# Patient Record
Sex: FEMALE | Race: WHITE | NOT HISPANIC OR LATINO | URBAN - METROPOLITAN AREA
[De-identification: names, ages, dates, MRNs, and addresses within clinical notes are randomized per-mention and may not be internally consistent; named-entity substitution may affect disease eponyms.]

---

## 2023-09-05 PROBLEM — H25.10 NUCLEAR SENILE CATARACT: Status: ACTIVE | Noted: 2023-09-05

## 2023-09-05 PROBLEM — H02.834 DERMATOCHALASIS OF LEFT UPPER EYELID: Status: ACTIVE | Noted: 2023-09-05

## 2023-09-05 PROBLEM — J40 SINOBRONCHITIS: Status: ACTIVE | Noted: 2023-09-05

## 2023-09-05 PROBLEM — H40.001 PREGLAUCOMA, UNSPECIFIED, RIGHT EYE: Status: ACTIVE | Noted: 2023-09-05

## 2023-09-05 PROBLEM — J32.9 SINOBRONCHITIS: Status: ACTIVE | Noted: 2023-09-05

## 2023-09-05 PROBLEM — H02.831 DERMATOCHALASIS OF RIGHT UPPER EYELID: Status: ACTIVE | Noted: 2023-09-05

## 2023-09-05 RX ORDER — ALBUTEROL SULFATE 90 UG/1
AEROSOL, METERED RESPIRATORY (INHALATION)
COMMUNITY
Start: 2019-12-23

## 2023-09-05 RX ORDER — FLUTICASONE FUROATE AND VILANTEROL 200; 25 UG/1; UG/1
POWDER RESPIRATORY (INHALATION)
COMMUNITY
Start: 2018-09-18

## 2023-09-05 RX ORDER — BENZONATATE 100 MG/1
100 CAPSULE ORAL 3 TIMES DAILY PRN
COMMUNITY
Start: 2020-10-23

## 2023-12-26 ENCOUNTER — CLINICAL SUPPORT (OUTPATIENT)
Dept: OPHTHALMOLOGY | Facility: CLINIC | Age: 68
End: 2023-12-26
Payer: MEDICARE

## 2023-12-26 ENCOUNTER — ANCILLARY PROCEDURE (OUTPATIENT)
Dept: RADIOLOGY | Facility: CLINIC | Age: 68
End: 2023-12-26
Payer: MEDICARE

## 2023-12-26 ENCOUNTER — OFFICE VISIT (OUTPATIENT)
Dept: OPHTHALMOLOGY | Facility: CLINIC | Age: 68
End: 2023-12-26
Payer: MEDICARE

## 2023-12-26 ENCOUNTER — APPOINTMENT (OUTPATIENT)
Dept: OPHTHALMOLOGY | Facility: CLINIC | Age: 68
End: 2023-12-26
Payer: MEDICARE

## 2023-12-26 DIAGNOSIS — Z13.6 ENCOUNTER FOR SCREENING FOR CARDIOVASCULAR DISORDERS: ICD-10-CM

## 2023-12-26 DIAGNOSIS — H40.001: Primary | ICD-10-CM

## 2023-12-26 DIAGNOSIS — H40.001 PREGLAUCOMA, UNSPECIFIED, RIGHT EYE: ICD-10-CM

## 2023-12-26 DIAGNOSIS — H25.813 COMBINED FORMS OF AGE-RELATED CATARACT OF BOTH EYES: ICD-10-CM

## 2023-12-26 DIAGNOSIS — H52.7 REFRACTION ERROR: ICD-10-CM

## 2023-12-26 DIAGNOSIS — E78.00 PURE HYPERCHOLESTEROLEMIA, UNSPECIFIED: ICD-10-CM

## 2023-12-26 PROCEDURE — 99214 OFFICE O/P EST MOD 30 MIN: CPT | Performed by: OPHTHALMOLOGY

## 2023-12-26 PROCEDURE — 99214 OFFICE O/P EST MOD 30 MIN: CPT | Mod: 25 | Performed by: OPHTHALMOLOGY

## 2023-12-26 PROCEDURE — 92015 DETERMINE REFRACTIVE STATE: CPT | Performed by: OPHTHALMOLOGY

## 2023-12-26 PROCEDURE — 75571 CT HRT W/O DYE W/CA TEST: CPT

## 2023-12-26 PROCEDURE — FLVLF CONTACT LENS EVALUATION (SP): Performed by: OPHTHALMOLOGY

## 2023-12-26 PROCEDURE — 92133 CPTRZD OPH DX IMG PST SGM ON: CPT | Performed by: OPHTHALMOLOGY

## 2023-12-26 RX ORDER — ROSUVASTATIN CALCIUM 10 MG/1
10 TABLET, COATED ORAL
COMMUNITY
Start: 2023-03-28

## 2023-12-26 RX ORDER — MUPIROCIN 20 MG/G
1 OINTMENT TOPICAL EVERY 12 HOURS PRN
COMMUNITY
Start: 2022-03-21

## 2023-12-26 RX ORDER — ESOMEPRAZOLE MAGNESIUM 40 MG/1
40 CAPSULE, DELAYED RELEASE ORAL
COMMUNITY

## 2023-12-26 RX ORDER — LANOLIN ALCOHOL/MO/W.PET/CERES
1000 CREAM (GRAM) TOPICAL
COMMUNITY
Start: 2021-03-18

## 2023-12-26 RX ORDER — FLUTICASONE FUROATE, UMECLIDINIUM BROMIDE AND VILANTEROL TRIFENATATE 100; 62.5; 25 UG/1; UG/1; UG/1
1 POWDER RESPIRATORY (INHALATION)
COMMUNITY
Start: 2023-10-03

## 2023-12-26 RX ORDER — MELOXICAM 15 MG/1
15 TABLET ORAL
COMMUNITY
Start: 2023-09-22

## 2023-12-26 RX ORDER — MONTELUKAST SODIUM 10 MG/1
10 TABLET ORAL NIGHTLY
COMMUNITY

## 2023-12-26 RX ORDER — FLUTICASONE PROPIONATE 50 MCG
1 SPRAY, SUSPENSION (ML) NASAL
COMMUNITY
Start: 2018-03-22

## 2023-12-26 RX ORDER — CLOBETASOL PROPIONATE 0.5 MG/G
EMULSION TOPICAL
COMMUNITY

## 2023-12-26 ASSESSMENT — ENCOUNTER SYMPTOMS
HEMATOLOGIC/LYMPHATIC NEGATIVE: 0
RESPIRATORY NEGATIVE: 0
EYES NEGATIVE: 0
ALLERGIC/IMMUNOLOGIC NEGATIVE: 0
PSYCHIATRIC NEGATIVE: 0
OCCASIONAL FEELINGS OF UNSTEADINESS: 0
CARDIOVASCULAR NEGATIVE: 0
DEPRESSION: 0
NEUROLOGICAL NEGATIVE: 0
MUSCULOSKELETAL NEGATIVE: 0
LOSS OF SENSATION IN FEET: 0
ENDOCRINE NEGATIVE: 0
CONSTITUTIONAL NEGATIVE: 0
GASTROINTESTINAL NEGATIVE: 0

## 2023-12-26 ASSESSMENT — KERATOMETRY
OD_AXISANGLE_DEGREES: 30
OD_K1POWER_DIOPTERS: 45.00
OS_AXISANGLE_DEGREES: 110
METHOD_AUTO_MANUAL: AUTOMATED
OD_AXISANGLE2_DEGREES: 120
OS_K1POWER_DIOPTERS: 45.00
OS_K2POWER_DIOPTERS: 45.25
OD_K2POWER_DIOPTERS: 45.50
OS_AXISANGLE2_DEGREES: 20

## 2023-12-26 ASSESSMENT — REFRACTION_MANIFEST
OS_AXIS: 060
OS_CYLINDER: -0.75
OD_SPHERE: +1.25
OD_AXIS: 100
OD_CYLINDER: -0.75
OS_SPHERE: +0.75

## 2023-12-26 ASSESSMENT — VISUAL ACUITY
OD_SC+: +1
METHOD: SNELLEN - SINGLE
OD_SC: 20/30
OS_SC: 20/20
OS_SC+: -1

## 2023-12-26 ASSESSMENT — EXTERNAL EXAM - LEFT EYE: OS_EXAM: 1+ BROW PTOSIS

## 2023-12-26 ASSESSMENT — PATIENT HEALTH QUESTIONNAIRE - PHQ9
1. LITTLE INTEREST OR PLEASURE IN DOING THINGS: NOT AT ALL
2. FEELING DOWN, DEPRESSED OR HOPELESS: NOT AT ALL
SUM OF ALL RESPONSES TO PHQ9 QUESTIONS 1 AND 2: 0

## 2023-12-26 ASSESSMENT — SLIT LAMP EXAM - LIDS
COMMENTS: 1+ BLEPHARITIS, 1+ DERMATOCHALASIS - UPPER LID
COMMENTS: 1+ BLEPHARITIS, 1+ DERMATOCHALASIS - UPPER LID

## 2023-12-26 ASSESSMENT — TONOMETRY
OD_IOP_MMHG: 14
IOP_METHOD: GOLDMANN APPLANATION
OS_IOP_MMHG: 14

## 2023-12-26 ASSESSMENT — REFRACTION_CURRENTRX
OS_SPHERE: +3.00
OS_DIAMETER: 14.0
OS_BRAND: ACUVUE VITA
OS_BASECURVE: 8.4

## 2023-12-26 ASSESSMENT — PAIN SCALES - GENERAL: PAINLEVEL: 0-NO PAIN

## 2023-12-26 ASSESSMENT — CUP TO DISC RATIO
OS_RATIO: 0.3
OD_RATIO: 0.4

## 2023-12-26 ASSESSMENT — EXTERNAL EXAM - RIGHT EYE: OD_EXAM: 1+ BROW PTOSIS

## 2023-12-26 NOTE — PROGRESS NOTES
Subjective   Patient ID: Paula Kline is a 68 y.o. female.    Chief Complaint    Annual Exam       HPI    No visual acuity (VA) complaints  Using soft contact lens (SCL) OS for monovision near  Last edited by MARC Scott on 12/26/2023  1:31 PM.        No current outpatient medications on file. (Ophthalmology pharm classes)       Current Outpatient Medications (Other)   Medication Sig Dispense Refill    albuterol (ProAir HFA) 90 mcg/actuation inhaler        benzonatate (Tessalon) 100 mg capsule Take 1 capsule (100 mg) by mouth 3 times a day as needed.      clobetasoL-emollient 0.05 % cream APPLY TOPICALLY TO THE AFFECTED AREA EVERY DAY AS DIRECTED      cyanocobalamin (Vitamin B-12) 1,000 mcg tablet Take 1 tablet (1,000 mcg) by mouth once daily.      denosumab (Prolia) 60 mg/mL syringe Inject 1 mL (60 mg total) under the skin every 6 months.      esomeprazole (NexIUM) 40 mg DR capsule Take 1 capsule (40 mg) by mouth.      fluticasone (Flonase) 50 mcg/actuation nasal spray 1 spray by Does not apply route once daily.      fluticasone furoate-vilanteroL (Breo Ellipta) 200-25 mcg/dose inhaler        meloxicam (Mobic) 15 mg tablet Take 1 tablet (15 mg) by mouth once daily.      mometasone-formoterol (Dulera 200) 200-5 mcg/actuation inhaler Inhale 2 puffs.      montelukast (Singulair) 10 mg tablet Take 1 tablet (10 mg) by mouth once daily at bedtime.      mupirocin (Bactroban) 2 % ointment Apply 1 Application topically every 12 hours if needed.      rosuvastatin (Crestor) 10 mg tablet Take 1 tablet (10 mg) by mouth.      Trelegy Ellipta 100-62.5-25 mcg blister with device Inhale 1 puff once daily.         Objective   Base Eye Exam       Visual Acuity (Snellen - Single)         Right Left Both    Dist sc 20/30 +1 20/20 -1     Near cc   J1+              Tonometry (Goldmann Applanation, 1:49 PM)         Right Left    Pressure 14 14              Pupils         Dark Shape React APD    Right 4 Round 2 None    Left 4  Round 2 None              Extraocular Movement         Right Left     Full Full              Dilation       Both eyes: 1% Tropic 2.5% Phen @ 1:50 PM                  Additional Tests       Keratometry (Automated)         K1 Axis K2 Axis    Right 45.00 120 45.50 30    Left 45.00 20 45.25 110                  Slit Lamp and Fundus Exam       External Exam         Right Left    External 1+ Brow ptosis 1+ Brow ptosis              Slit Lamp Exam         Right Left    Lids/Lashes 1+ Blepharitis, 1+ Dermatochalasis - upper lid 1+ Blepharitis, 1+ Dermatochalasis - upper lid    Conjunctiva/Sclera normal bulbar and palepbral conjunctiva normal bulbar and palepbral conjunctiva    Cornea normal epi/stroma/endo and tear film normal epi/stroma/endo and tear film    Anterior Chamber normal anterior chamber, deep and quiet normal anterior chamber, deep and quiet    Iris pupil miotic pupil miotic    Lens 2+ Nuclear sclerosis, 1+ Cortical cataract 2+ Nuclear sclerosis, 1+ Cortical cataract    Anterior Vitreous vitreous clear and normal vitreous clear and normal              Fundus Exam         Right Left    Disc Normal Normal    C/D Ratio 0.4 0.3    Macula normal macula normal macula    Vessels normal retinal vessels normal retinal vessels    Periphery normal retinal periphery normal retinal periphery                  Refraction       Manifest Refraction         Sphere Cylinder Axis    Right +1.25 -0.75 100    Left +0.75 -0.75 060              Final Rx         Sphere Dist VA Add Near VA    Right +0.50 20/20 +2.75 20/20    Left +0.50 20/20 +2.75 20/20      Type: progressive    Expiration Date: 12/26/2025    Pupillary Distance: 64                  Contact Lens Exam       Current Contact Lens Rx         Brand Base Curve Diameter Sphere    Right        Left Acuvue Mary 8.4 14.0 +3.00      Comments: Keep Rx same   Soft contact lens (SCL) OS only for near visual acuity (VA)              Keratometry (Automated)         K1 Axis K2 Axis     Right 45.00 120 45.50 30    Left 45.00 20 45.25 110              Final Contact Lens Rx         Brand Base Curve Diameter Sphere    Right        Left Acuvue Mary 8.4 14.0 +3.00      Expiration Date: 12/26/2024    Replacement: Monthly                    Assessment/Plan   Problem List Items Addressed This Visit          Eye/Vision problems    Combined forms of age-related cataract of both eyes    Preglaucoma, unspecified, right eye    Refraction error     Other Visit Diagnoses       Preglaucoma, right    -  Primary    Relevant Orders    OCT, Optic Nerve - OU - Both Eyes (Completed)

## 2024-12-30 ENCOUNTER — APPOINTMENT (OUTPATIENT)
Dept: OPHTHALMOLOGY | Facility: CLINIC | Age: 69
End: 2024-12-30
Payer: MEDICARE

## 2024-12-30 DIAGNOSIS — H04.123 DRY EYES: ICD-10-CM

## 2024-12-30 DIAGNOSIS — H02.834 DERMATOCHALASIS OF BOTH UPPER EYELIDS: ICD-10-CM

## 2024-12-30 DIAGNOSIS — H52.7 REFRACTION ERROR: ICD-10-CM

## 2024-12-30 DIAGNOSIS — H25.813 COMBINED FORMS OF AGE-RELATED CATARACT OF BOTH EYES: ICD-10-CM

## 2024-12-30 DIAGNOSIS — H40.001 PREGLAUCOMA, UNSPECIFIED, RIGHT EYE: Primary | ICD-10-CM

## 2024-12-30 DIAGNOSIS — H02.831 DERMATOCHALASIS OF BOTH UPPER EYELIDS: ICD-10-CM

## 2024-12-30 PROCEDURE — 92133 CPTRZD OPH DX IMG PST SGM ON: CPT | Performed by: OPHTHALMOLOGY

## 2024-12-30 PROCEDURE — 92015 DETERMINE REFRACTIVE STATE: CPT | Performed by: OPHTHALMOLOGY

## 2024-12-30 PROCEDURE — 99214 OFFICE O/P EST MOD 30 MIN: CPT | Performed by: OPHTHALMOLOGY

## 2024-12-30 PROCEDURE — 92015 DETERMINE REFRACTIVE STATE: CPT | Mod: MUE | Performed by: OPHTHALMOLOGY

## 2024-12-30 RX ORDER — PANTOPRAZOLE SODIUM 40 MG/1
40 TABLET, DELAYED RELEASE ORAL
COMMUNITY
Start: 2024-01-26

## 2024-12-30 RX ORDER — FLUTICASONE FUROATE AND VILANTEROL TRIFENATATE 100; 25 UG/1; UG/1
1 POWDER RESPIRATORY (INHALATION) DAILY
COMMUNITY
Start: 2024-11-26

## 2024-12-30 RX ORDER — AZELASTINE 1 MG/ML
1 SPRAY, METERED NASAL 2 TIMES DAILY
COMMUNITY
Start: 2024-01-03

## 2024-12-30 ASSESSMENT — REFRACTION_MANIFEST
OD_AXIS: 100
METHOD_AUTOREFRACTION: 1
OS_CYLINDER: -0.75
OD_SPHERE: +1.75
OS_SPHERE: +1.00
OD_CYLINDER: -0.50
OD_CYLINDER: -1.50
OS_SPHERE: +0.50
OD_AXIS: 100
OS_CYLINDER: -0.75
OS_AXIS: 075
OD_SPHERE: +1.00
OS_AXIS: 075
OD_ADD: +2.75
OS_ADD: +2.75

## 2024-12-30 ASSESSMENT — VISUAL ACUITY
OD_SC+: +1
OS_SC: 20/30
METHOD: SNELLEN - LINEAR
OD_SC: 20/30

## 2024-12-30 ASSESSMENT — REFRACTION_WEARINGRX
SPECS_TYPE: READERS
OD_SPHERE: +1.75
OS_SPHERE: +1.75

## 2024-12-30 ASSESSMENT — TONOMETRY
IOP_METHOD: GOLDMANN APPLANATION
OS_IOP_MMHG: 10
OD_IOP_MMHG: 10

## 2024-12-30 ASSESSMENT — ENCOUNTER SYMPTOMS
MUSCULOSKELETAL NEGATIVE: 0
CONSTITUTIONAL NEGATIVE: 0
ALLERGIC/IMMUNOLOGIC NEGATIVE: 0
EYES NEGATIVE: 0
PSYCHIATRIC NEGATIVE: 0
RESPIRATORY NEGATIVE: 0
GASTROINTESTINAL NEGATIVE: 0
ENDOCRINE NEGATIVE: 0
NEUROLOGICAL NEGATIVE: 0
CARDIOVASCULAR NEGATIVE: 0
HEMATOLOGIC/LYMPHATIC NEGATIVE: 0

## 2024-12-30 ASSESSMENT — PATIENT HEALTH QUESTIONNAIRE - PHQ9
2. FEELING DOWN, DEPRESSED OR HOPELESS: NOT AT ALL
1. LITTLE INTEREST OR PLEASURE IN DOING THINGS: NOT AT ALL
SUM OF ALL RESPONSES TO PHQ9 QUESTIONS 1 AND 2: 0

## 2024-12-30 ASSESSMENT — KERATOMETRY
OD_K1POWER_DIOPTERS: 45.00
OD_K2POWER_DIOPTERS: 45.75
OD_AXISANGLE2_DEGREES: 115
OD_AXISANGLE_DEGREES: 25
OS_AXISANGLE_DEGREES: 90
OS_K2POWER_DIOPTERS: 45.25
OS_K1POWER_DIOPTERS: 45.25
OS_AXISANGLE2_DEGREES: 180

## 2024-12-30 ASSESSMENT — CUP TO DISC RATIO
OD_RATIO: 0.4
OS_RATIO: 0.3

## 2024-12-30 ASSESSMENT — EXTERNAL EXAM - RIGHT EYE: OD_EXAM: 1+ BROW PTOSIS

## 2024-12-30 ASSESSMENT — EXTERNAL EXAM - LEFT EYE: OS_EXAM: 1+ BROW PTOSIS

## 2024-12-30 ASSESSMENT — PAIN SCALES - GENERAL: PAINLEVEL_OUTOF10: 0-NO PAIN

## 2024-12-30 NOTE — PROGRESS NOTES
Subjective   Patient ID: Paula Kline is a 69 y.o. female.    Chief Complaint    Annual Exam       HPI    Complete and pre-glaucoma exam.  No new changes in health history or meds.  Vision is mostly good, some difficulty reading.    Last edited by Pedro Guadalupe MD on 12/30/2024  3:32 PM.        No current outpatient medications on file. (Ophthalmology pharm classes)       Current Outpatient Medications (Other)   Medication Sig Dispense Refill    albuterol (ProAir HFA) 90 mcg/actuation inhaler        azelastine (Astelin) 137 mcg (0.1 %) nasal spray 1 spray by Does not apply route twice a day.      benzonatate (Tessalon) 100 mg capsule Take 1 capsule (100 mg) by mouth 3 times a day as needed.      Breo Ellipta 100-25 mcg/dose inhaler Inhale 1 puff once daily.      clobetasoL-emollient 0.05 % cream APPLY TOPICALLY TO THE AFFECTED AREA EVERY DAY AS DIRECTED      cyanocobalamin (Vitamin B-12) 1,000 mcg tablet Take 1 tablet (1,000 mcg) by mouth once daily.      esomeprazole (NexIUM) 40 mg DR capsule Take 1 capsule (40 mg) by mouth.      fluticasone (Flonase) 50 mcg/actuation nasal spray 1 spray by Does not apply route once daily.      fluticasone furoate-vilanteroL (Breo Ellipta) 200-25 mcg/dose inhaler        meloxicam (Mobic) 15 mg tablet Take 1 tablet (15 mg) by mouth once daily.      mometasone-formoterol (Dulera 200) 200-5 mcg/actuation inhaler Inhale 2 puffs.      montelukast (Singulair) 10 mg tablet Take 1 tablet (10 mg) by mouth once daily at bedtime.      mupirocin (Bactroban) 2 % ointment Apply 1 Application topically every 12 hours if needed.      pantoprazole (ProtoNix) 40 mg EC tablet Take 1 tablet (40 mg) by mouth once daily.      rosuvastatin (Crestor) 10 mg tablet Take 1 tablet (10 mg) by mouth.      Trelegy Ellipta 100-62.5-25 mcg blister with device Inhale 1 puff once daily.      denosumab (Prolia) 60 mg/mL syringe Inject 1 mL (60 mg total) under the skin every 6 months.         Objective   Base  Eye Exam       Visual Acuity (Snellen - Linear)         Right Left Both    Dist sc 20/30 +1 20/30     Near cc   J1              Tonometry (Goldmann Applanation, 2:44 PM)         Right Left    Pressure 10 10              Pupils         Dark Shape React APD    Right 3 Miotic Minimal None    Left 3 Miotic Minimal None              Extraocular Movement         Right Left     Full Full              Dilation       Both eyes: 1% Tropic 2.5% Phen @ 2:44 PM                  Additional Tests       Keratometry         K1 Axis K2 Axis    Right 45.00 115 45.75 25    Left 45.25 180 45.25 90                  Slit Lamp and Fundus Exam       External Exam         Right Left    External 1+ Brow ptosis 1+ Brow ptosis              Slit Lamp Exam         Right Left    Lids/Lashes 1+ Blepharitis, 1+ Dermatochalasis - upper lid 1+ Blepharitis, 1+ Dermatochalasis - upper lid    Conjunctiva/Sclera normal bulbar and palepbral conjunctiva normal bulbar and palepbral conjunctiva    Cornea normal epi/stroma/endo and tear film normal epi/stroma/endo and tear film    Anterior Chamber normal anterior chamber, deep and quiet normal anterior chamber, deep and quiet    Iris pupil miotic pupil miotic    Lens 2+ Nuclear sclerosis, 1+ Cortical cataract 2+ Nuclear sclerosis, 1+ Cortical cataract    Anterior Vitreous vitreous clear and normal vitreous clear and normal              Fundus Exam         Right Left    Disc Normal Normal    C/D Ratio 0.4 0.3    Macula normal macula normal macula    Vessels normal retinal vessels normal retinal vessels    Periphery normal retinal periphery normal retinal periphery                  Refraction       Wearing Rx         Sphere    Right +1.75    Left +1.75      Type: READERS              Manifest Refraction (Auto)         Sphere Cylinder [place] Dist VA Add Near VA    Right +1.75 -1.50 100       Left +1.00 -0.75 075                 Manifest Refraction #2 (Subjective)         Sphere Cylinder [place] Dist VA Add Near VA     Right +1.00 -0.50 100 20/20 +2.75 20/20    Left +0.50 -0.75 075 20/20 +2.75 20/20      Pupillary Distance: 60              Final Rx         Sphere Cylinder [place] Dist VA Add Near VA    Right +1.00 -0.50 100 20/20 +2.75 20/20    Left +0.50 -0.50 100 20/20 +2.75 20/20      Expiration Date: 12/30/2026    Pupillary Distance: 60                    Assessment/Plan   Problem List Items Addressed This Visit          Eye/Vision problems    Dermatochalasis of both upper eyelids    Combined forms of age-related cataract of both eyes     F/u one year full with oct nerves.           Preglaucoma, unspecified, right eye - Primary    Relevant Orders    OCT, Optic Nerve - OU - Both Eyes (Completed)    Refraction error    Dry eyes

## 2026-01-02 ENCOUNTER — APPOINTMENT (OUTPATIENT)
Dept: OPHTHALMOLOGY | Facility: CLINIC | Age: 71
End: 2026-01-02
Payer: MEDICARE